# Patient Record
Sex: MALE | Race: WHITE
[De-identification: names, ages, dates, MRNs, and addresses within clinical notes are randomized per-mention and may not be internally consistent; named-entity substitution may affect disease eponyms.]

---

## 2023-07-17 PROBLEM — Z00.00 ENCOUNTER FOR PREVENTIVE HEALTH EXAMINATION: Status: ACTIVE | Noted: 2023-07-17

## 2023-07-20 ENCOUNTER — APPOINTMENT (OUTPATIENT)
Dept: ORTHOPEDIC SURGERY | Facility: CLINIC | Age: 73
End: 2023-07-20
Payer: MEDICARE

## 2023-07-20 VITALS — HEIGHT: 69 IN | WEIGHT: 185 LBS | BODY MASS INDEX: 27.4 KG/M2

## 2023-07-20 DIAGNOSIS — E78.00 PURE HYPERCHOLESTEROLEMIA, UNSPECIFIED: ICD-10-CM

## 2023-07-20 DIAGNOSIS — Z78.9 OTHER SPECIFIED HEALTH STATUS: ICD-10-CM

## 2023-07-20 DIAGNOSIS — I10 ESSENTIAL (PRIMARY) HYPERTENSION: ICD-10-CM

## 2023-07-20 PROCEDURE — 73030 X-RAY EXAM OF SHOULDER: CPT | Mod: LT

## 2023-07-20 PROCEDURE — 73010 X-RAY EXAM OF SHOULDER BLADE: CPT | Mod: LT

## 2023-07-20 PROCEDURE — 99214 OFFICE O/P EST MOD 30 MIN: CPT

## 2023-07-20 RX ORDER — SIMVASTATIN 10 MG/1
10 TABLET, FILM COATED ORAL
Refills: 0 | Status: ACTIVE | COMMUNITY

## 2023-07-20 RX ORDER — LOSARTAN POTASSIUM 25 MG/1
25 TABLET, FILM COATED ORAL
Refills: 0 | Status: ACTIVE | COMMUNITY

## 2023-07-20 NOTE — DATA REVIEWED
[MRI] : MRI [Left] : left [Shoulder] : shoulder [Report was reviewed and noted in the chart] : The report was reviewed and noted in the chart [FreeTextEntry1] : Partial supra, infra and subscap tears, labral tearing, bursitis.

## 2023-07-20 NOTE — ASSESSMENT
[FreeTextEntry1] : L PRCT.\par MRI reports reviewed - partial supra, infra and subscap tears, labral tearing, bursitis.\par Activity modification.\par Ice.\par He did PT.\par HEP provided.\par RTO prn for L SA injection.\par \par \par

## 2023-07-20 NOTE — PHYSICAL EXAM
[Left] : left shoulder [5 ___] : forward flexion 5[unfilled]/5 [5___] : external rotation 5[unfilled]/5 [] : no ecchymosis [FreeTextEntry9] : \par ER 50

## 2023-07-20 NOTE — HISTORY OF PRESENT ILLNESS
[1] : 2 [9] : 9 [Sharp] : sharp [Constant] : constant [Sleep] : sleep [Meds] : meds [Ice] : ice [Lying in bed] : lying in bed [de-identified] : 7/20/23: 72 yo RHD male with left shoulder pain since April 2022. Denies specific injury but states he is very active. He tore his proximal biceps while fixing a boat. He states he now has increased pain deep in his shoulder. There is pain laterally at night. He was seen at [facility] and had MRI. He went to PT but some exercises increased pain. He was taking Ibuprofen 800mg but stopped due to GI irritation. \par \par MRI L shoulder (report only): Partial supra, infra and subscap tears, labral tearing, bursitis. [] : no [FreeTextEntry1] : left shoulder [FreeTextEntry4] : 1-2 years [FreeTextEntry5] : states has slight tear in bicep muscle from lifting something heavy [FreeTextEntry6] : pain [FreeTextEntry7] : left arm [de-identified] : [facility] [de-identified] : MRI 1 year ago

## 2023-07-25 ENCOUNTER — APPOINTMENT (OUTPATIENT)
Dept: ORTHOPEDIC SURGERY | Facility: CLINIC | Age: 73
End: 2023-07-25
Payer: MEDICARE

## 2023-07-25 VITALS — HEIGHT: 69 IN | WEIGHT: 185 LBS | BODY MASS INDEX: 27.4 KG/M2

## 2023-07-25 DIAGNOSIS — S46.212D STRAIN OF MUSCLE, FASCIA AND TENDON OF OTHER PARTS OF BICEPS, LEFT ARM, SUBSEQUENT ENCOUNTER: ICD-10-CM

## 2023-07-25 DIAGNOSIS — M75.112 INCOMPLETE ROTATOR CUFF TEAR OR RUPTURE OF LEFT SHOULDER, NOT SPECIFIED AS TRAUMATIC: ICD-10-CM

## 2023-07-25 PROCEDURE — 99213 OFFICE O/P EST LOW 20 MIN: CPT | Mod: 25

## 2023-07-25 PROCEDURE — 20611 DRAIN/INJ JOINT/BURSA W/US: CPT | Mod: LT

## 2023-07-25 PROCEDURE — J3490M: CUSTOM | Mod: NC

## 2023-07-25 NOTE — ASSESSMENT
[FreeTextEntry1] : L PRCT.\par MRI reports reviewed - partial supra, infra and subscap tears, labral tearing, bursitis.\par Activity modification.\par Ice.\par He did PT.\par HEP provided.\par L SA injection given.\par RTO prn\par \par Procedure note:\par Large Joint Injection was performed because of pain and inflammation, failure of conservative treatment.  \par Medications:\par Depo-Medrol: 1 cc, 80 mg.\par Lidocaine: 2 cc, 1%. \par Marcaine: 2 cc, .25%. \par \par Medication was injected in the left subacromial space. Patient has tried OTC's including aspirin, Ibuprofen, Aleve etc or prescription NSAIDs, and/or exercises at home and/ or physical therapy without satisfactory response. The risks, benefits, and alternatives to cortisone injection were explained in full to the patient. Risks outlined include but are not limited to infection, sepsis, bleeding, scarring, skin discoloration, temporary increase in pain, syncopal episode, failure to resolve symptoms, allergic reaction, symptom recurrence, and elevation of blood sugar in diabetics. Patient understood the risks. All questions were answered. After discussion of options, patient requested an injection. Oral informed consent was obtained and sterile prep of the injection site was performed using alcohol. Sterile technique was utilized for the procedure including the preparation of the solutions used for the injection. Ethyl chloride spray was used topically.  Sterile technique used. Patient tolerated procedure well. Post Procedure Instructions: Patient was advised to call if redness, pain, or fever occur and apply ice for 15 min. out of every hour for the next 12-24 hours as tolerated. patient was advised to rest the joint(s) for 2 days.\par \par Ultrasound Guidance was used for the following reasons: for prior failure or difficult injection and to visualize tearing and inflammation.\par Ultrasound guided injection was performed of the shoulder, visualization of the needle and placement of injection was performed without complication.\par \par \par

## 2023-07-25 NOTE — HISTORY OF PRESENT ILLNESS
[1] : 2 [9] : 9 [Sharp] : sharp [Constant] : constant [Sleep] : sleep [Meds] : meds [Ice] : ice [Lying in bed] : lying in bed [de-identified] : 7/25/23: Here for follow up. He reports continued pain.\par \par 7/20/23: 74 yo RHD male with left shoulder pain since April 2022. Denies specific injury but states he is very active. He tore his proximal biceps while fixing a boat. He states he now has increased pain deep in his shoulder. There is pain laterally at night. He was seen at [facility] and had MRI. He went to PT but some exercises increased pain. He was taking Ibuprofen 800mg but stopped due to GI irritation. \par \par MRI L shoulder (report only): Partial supra, infra and subscap tears, labral tearing, bursitis. [] : no [FreeTextEntry1] : left shoulder [FreeTextEntry4] : 1-2 years [FreeTextEntry5] : states has slight tear in bicep muscle from lifting something heavy [FreeTextEntry6] : pain [FreeTextEntry7] : left arm [de-identified] : [facility] [de-identified] : MRI 1 year ago

## 2023-07-25 NOTE — PHYSICAL EXAM
[Left] : left shoulder [5 ___] : forward flexion 5[unfilled]/5 [5___] : internal rotation 5[unfilled]/5 [] : no ecchymosis [FreeTextEntry9] : \par ER 50

## 2024-06-11 ENCOUNTER — APPOINTMENT (OUTPATIENT)
Dept: CT IMAGING | Facility: CLINIC | Age: 74
End: 2024-06-11
Payer: MEDICARE

## 2024-06-11 PROCEDURE — 71250 CT THORAX DX C-: CPT | Mod: MH

## 2024-12-18 ENCOUNTER — APPOINTMENT (OUTPATIENT)
Dept: CT IMAGING | Facility: CLINIC | Age: 74
End: 2024-12-18
Payer: MEDICARE

## 2024-12-18 PROCEDURE — 75574 CT ANGIO HRT W/3D IMAGE: CPT | Mod: MH

## 2024-12-20 PROCEDURE — 75580 N-INVAS EST C FFR SW ALY CTA: CPT
